# Patient Record
Sex: MALE | Race: WHITE | NOT HISPANIC OR LATINO | Employment: OTHER | URBAN - METROPOLITAN AREA
[De-identification: names, ages, dates, MRNs, and addresses within clinical notes are randomized per-mention and may not be internally consistent; named-entity substitution may affect disease eponyms.]

---

## 2020-08-15 ENCOUNTER — HOSPITAL ENCOUNTER (INPATIENT)
Facility: MEDICAL CENTER | Age: 80
LOS: 1 days | DRG: 310 | End: 2020-08-16
Attending: EMERGENCY MEDICINE | Admitting: INTERNAL MEDICINE
Payer: COMMERCIAL

## 2020-08-15 ENCOUNTER — APPOINTMENT (OUTPATIENT)
Dept: RADIOLOGY | Facility: MEDICAL CENTER | Age: 80
DRG: 310 | End: 2020-08-15
Attending: EMERGENCY MEDICINE
Payer: COMMERCIAL

## 2020-08-15 DIAGNOSIS — I47.10 PAROXYSMAL SVT (SUPRAVENTRICULAR TACHYCARDIA) (HCC): ICD-10-CM

## 2020-08-15 LAB
ALBUMIN SERPL BCP-MCNC: 4.2 G/DL (ref 3.2–4.9)
ALBUMIN/GLOB SERPL: 1.7 G/DL
ALP SERPL-CCNC: 59 U/L (ref 30–99)
ALT SERPL-CCNC: 39 U/L (ref 2–50)
ANION GAP SERPL CALC-SCNC: 16 MMOL/L (ref 7–16)
AST SERPL-CCNC: 29 U/L (ref 12–45)
BASOPHILS # BLD AUTO: 0.6 % (ref 0–1.8)
BASOPHILS # BLD: 0.04 K/UL (ref 0–0.12)
BILIRUB SERPL-MCNC: 0.4 MG/DL (ref 0.1–1.5)
BUN SERPL-MCNC: 22 MG/DL (ref 8–22)
CALCIUM SERPL-MCNC: 9.5 MG/DL (ref 8.5–10.5)
CHLORIDE SERPL-SCNC: 104 MMOL/L (ref 96–112)
CO2 SERPL-SCNC: 21 MMOL/L (ref 20–33)
CREAT SERPL-MCNC: 1.17 MG/DL (ref 0.5–1.4)
EKG IMPRESSION: NORMAL
EOSINOPHIL # BLD AUTO: 0.11 K/UL (ref 0–0.51)
EOSINOPHIL NFR BLD: 1.5 % (ref 0–6.9)
ERYTHROCYTE [DISTWIDTH] IN BLOOD BY AUTOMATED COUNT: 43.1 FL (ref 35.9–50)
GLOBULIN SER CALC-MCNC: 2.5 G/DL (ref 1.9–3.5)
GLUCOSE SERPL-MCNC: 101 MG/DL (ref 65–99)
HCT VFR BLD AUTO: 48.5 % (ref 42–52)
HGB BLD-MCNC: 16.5 G/DL (ref 14–18)
IMM GRANULOCYTES # BLD AUTO: 0.01 K/UL (ref 0–0.11)
IMM GRANULOCYTES NFR BLD AUTO: 0.1 % (ref 0–0.9)
LYMPHOCYTES # BLD AUTO: 2.94 K/UL (ref 1–4.8)
LYMPHOCYTES NFR BLD: 41.1 % (ref 22–41)
MAGNESIUM SERPL-MCNC: 2.1 MG/DL (ref 1.5–2.5)
MCH RBC QN AUTO: 31.3 PG (ref 27–33)
MCHC RBC AUTO-ENTMCNC: 34 G/DL (ref 33.7–35.3)
MCV RBC AUTO: 92 FL (ref 81.4–97.8)
MONOCYTES # BLD AUTO: 0.6 K/UL (ref 0–0.85)
MONOCYTES NFR BLD AUTO: 8.4 % (ref 0–13.4)
NEUTROPHILS # BLD AUTO: 3.45 K/UL (ref 1.82–7.42)
NEUTROPHILS NFR BLD: 48.3 % (ref 44–72)
NRBC # BLD AUTO: 0 K/UL
NRBC BLD-RTO: 0 /100 WBC
PLATELET # BLD AUTO: 197 K/UL (ref 164–446)
PMV BLD AUTO: 9.8 FL (ref 9–12.9)
POTASSIUM SERPL-SCNC: 4 MMOL/L (ref 3.6–5.5)
PROT SERPL-MCNC: 6.7 G/DL (ref 6–8.2)
RBC # BLD AUTO: 5.27 M/UL (ref 4.7–6.1)
SODIUM SERPL-SCNC: 141 MMOL/L (ref 135–145)
TROPONIN T SERPL-MCNC: 24 NG/L (ref 6–19)
WBC # BLD AUTO: 7.2 K/UL (ref 4.8–10.8)

## 2020-08-15 PROCEDURE — 770020 HCHG ROOM/CARE - TELE (206)

## 2020-08-15 PROCEDURE — C9803 HOPD COVID-19 SPEC COLLECT: HCPCS | Performed by: INTERNAL MEDICINE

## 2020-08-15 PROCEDURE — 80053 COMPREHEN METABOLIC PANEL: CPT

## 2020-08-15 PROCEDURE — 93005 ELECTROCARDIOGRAM TRACING: CPT | Performed by: EMERGENCY MEDICINE

## 2020-08-15 PROCEDURE — 99221 1ST HOSP IP/OBS SF/LOW 40: CPT | Performed by: INTERNAL MEDICINE

## 2020-08-15 PROCEDURE — 83735 ASSAY OF MAGNESIUM: CPT

## 2020-08-15 PROCEDURE — 71045 X-RAY EXAM CHEST 1 VIEW: CPT

## 2020-08-15 PROCEDURE — 99285 EMERGENCY DEPT VISIT HI MDM: CPT

## 2020-08-15 PROCEDURE — 85025 COMPLETE CBC W/AUTO DIFF WBC: CPT

## 2020-08-15 PROCEDURE — 36415 COLL VENOUS BLD VENIPUNCTURE: CPT

## 2020-08-15 PROCEDURE — 700102 HCHG RX REV CODE 250 W/ 637 OVERRIDE(OP): Performed by: INTERNAL MEDICINE

## 2020-08-15 PROCEDURE — 84484 ASSAY OF TROPONIN QUANT: CPT

## 2020-08-15 PROCEDURE — A9270 NON-COVERED ITEM OR SERVICE: HCPCS | Performed by: INTERNAL MEDICINE

## 2020-08-15 PROCEDURE — 93005 ELECTROCARDIOGRAM TRACING: CPT

## 2020-08-15 RX ORDER — ASPIRIN 81 MG/1
81 TABLET, CHEWABLE ORAL DAILY
COMMUNITY
End: 2023-05-22

## 2020-08-15 RX ORDER — AMOXICILLIN 250 MG
2 CAPSULE ORAL 2 TIMES DAILY
Status: DISCONTINUED | OUTPATIENT
Start: 2020-08-15 | End: 2020-08-16 | Stop reason: HOSPADM

## 2020-08-15 RX ORDER — OLMESARTAN MEDOXOMIL 20 MG/1
20 TABLET ORAL DAILY
Status: ON HOLD | COMMUNITY
End: 2020-08-16

## 2020-08-15 RX ORDER — BISACODYL 10 MG
10 SUPPOSITORY, RECTAL RECTAL
Status: DISCONTINUED | OUTPATIENT
Start: 2020-08-15 | End: 2020-08-16 | Stop reason: HOSPADM

## 2020-08-15 RX ORDER — POLYETHYLENE GLYCOL 3350 17 G/17G
1 POWDER, FOR SOLUTION ORAL
Status: DISCONTINUED | OUTPATIENT
Start: 2020-08-15 | End: 2020-08-16 | Stop reason: HOSPADM

## 2020-08-15 RX ADMIN — METOPROLOL TARTRATE 12.5 MG: 25 TABLET, FILM COATED ORAL at 06:38

## 2020-08-15 RX ADMIN — METOPROLOL TARTRATE 12.5 MG: 25 TABLET, FILM COATED ORAL at 20:06

## 2020-08-15 RX ADMIN — METOPROLOL TARTRATE 12.5 MG: 25 TABLET, FILM COATED ORAL at 13:10

## 2020-08-15 SDOH — HEALTH STABILITY: MENTAL HEALTH: HOW OFTEN DO YOU HAVE 6 OR MORE DRINKS ON ONE OCCASION?: MONTHLY

## 2020-08-15 SDOH — HEALTH STABILITY: MENTAL HEALTH: HOW MANY STANDARD DRINKS CONTAINING ALCOHOL DO YOU HAVE ON A TYPICAL DAY?: 1 OR 2

## 2020-08-15 SDOH — HEALTH STABILITY: MENTAL HEALTH: HOW OFTEN DO YOU HAVE A DRINK CONTAINING ALCOHOL?: 2-3 TIMES A WEEK

## 2020-08-15 ASSESSMENT — LIFESTYLE VARIABLES
EVER FELT BAD OR GUILTY ABOUT YOUR DRINKING: NO
CONSUMPTION TOTAL: NEGATIVE
AVERAGE NUMBER OF DAYS PER WEEK YOU HAVE A DRINK CONTAINING ALCOHOL: 3
EVER_SMOKED: NEVER
HOW MANY TIMES IN THE PAST YEAR HAVE YOU HAD 5 OR MORE DRINKS IN A DAY: 0
TOTAL SCORE: 0
EVER HAD A DRINK FIRST THING IN THE MORNING TO STEADY YOUR NERVES TO GET RID OF A HANGOVER: NO
ALCOHOL_USE: YES
TOTAL SCORE: 0
ON A TYPICAL DAY WHEN YOU DRINK ALCOHOL HOW MANY DRINKS DO YOU HAVE: 1
HAVE PEOPLE ANNOYED YOU BY CRITICIZING YOUR DRINKING: NO
HAVE YOU EVER FELT YOU SHOULD CUT DOWN ON YOUR DRINKING: NO
TOTAL SCORE: 0

## 2020-08-15 ASSESSMENT — COGNITIVE AND FUNCTIONAL STATUS - GENERAL
SUGGESTED CMS G CODE MODIFIER DAILY ACTIVITY: CH
SUGGESTED CMS G CODE MODIFIER MOBILITY: CH
MOBILITY SCORE: 24
DAILY ACTIVITIY SCORE: 24

## 2020-08-15 ASSESSMENT — ENCOUNTER SYMPTOMS
RESPIRATORY NEGATIVE: 1
CONSTITUTIONAL NEGATIVE: 1
GASTROINTESTINAL NEGATIVE: 1
MUSCULOSKELETAL NEGATIVE: 1
BRUISES/BLEEDS EASILY: 0
PSYCHIATRIC NEGATIVE: 1
CARDIOVASCULAR NEGATIVE: 1
DIZZINESS: 1
SHORTNESS OF BREATH: 0
BLOOD IN STOOL: 0

## 2020-08-15 ASSESSMENT — PATIENT HEALTH QUESTIONNAIRE - PHQ9
1. LITTLE INTEREST OR PLEASURE IN DOING THINGS: NOT AT ALL
SUM OF ALL RESPONSES TO PHQ9 QUESTIONS 1 AND 2: 0
2. FEELING DOWN, DEPRESSED, IRRITABLE, OR HOPELESS: NOT AT ALL

## 2020-08-15 ASSESSMENT — FIBROSIS 4 INDEX: FIB4 SCORE: 1.84

## 2020-08-15 NOTE — ED TRIAGE NOTES
Armond Marroquin  79 y.o.  male  Chief Complaint   Patient presents with   • Irregular Heart Beat     c/o irregular heart beat since monday morning. seen at [facility] wednesday night for the same was admitted and Dc 'd yesterday afternoon. patient states feeling he is going to pass out/ lightheaded.

## 2020-08-15 NOTE — H&P
Physician H&P    Patient ID:  Armond Marroquin  [id number]  79 y.o. male  1940    History:  Primary Diagnosis: SVT    HPI:  The patient is a very pleasant 79-year-old gentleman who was driven to the ER by his wife for follow-up of SVT.  He was hospitalized at [facility] recently where he was diagnosed with SVT.  Echo report shows normal LV systolic function and no pulmonary hypertension.  He was started on metoprolol twice daily, but noted that towards the end of the dosing period, he did have breakthrough SVT.  He became frustrated in the left the hospital AGAINST MEDICAL ADVICE 2 days ago.  According to the discharge summary from [facility], it was noted that on 25 mg of metoprolol his SVT was totally controlled but he had resting bradycardia into the low 50s.  His dose was reduced to 12.5 mg.  But this did not apparently control his SVT    The patient states that he has noted this for only the past few days.  He noticed on his blood pressure cuff that he was having rapid heart beating but did not feel particularly lightheaded or have a sense of palpitations.    He is active for his age at work he does not exercise regularly.  There is no history of known coronary disease, exercise intolerance, chest pain or exertional dyspnea.  He thinks he may have passed out very briefly while riding in the car on the way to the hospital last night.    Past Medical History:  has a past medical history of Hypertension.  Past Surgical History:  has no past surgical history on file.  Past Social History:  reports that he has never smoked. He has never used smokeless tobacco. He reports current alcohol use of about 2.4 oz of alcohol per week. He reports that he does not use drugs.  The patient is .  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    Past Family history: Mother  of complications of therapy of for hydrocephalus at age 78.  Father  age 89 from complications of a bladder  "infection.  No family history of premature coronary disease.  Allergies: Amoxicillin    Current Medications:  Prior to Admission medications    Medication Sig Start Date End Date Taking? Authorizing Provider   olmesartan (BENICAR) 20 MG Tab Take 20 mg by mouth every day.   Yes Nn Emergency Md Per Protocol, M.D.       Review of Systems:  Review of Systems   Constitutional: Negative.    HENT: Negative.    Respiratory: Negative.  Negative for shortness of breath.         Suspected sleep apnea per wife's report.  Patient snores   Cardiovascular: Negative.    Gastrointestinal: Negative.  Negative for blood in stool.   Musculoskeletal: Negative.    Skin: Negative.    Neurological: Positive for dizziness.        Patient may have had brief syncope in route to the hospital while sitting in the passenger seat of the car.   Endo/Heme/Allergies: Negative.  Does not bruise/bleed easily.   Psychiatric/Behavioral: Negative.      /90   Pulse (!) 109   Temp 36 °C (96.8 °F) (Temporal)   Resp 17   Ht 1.753 m (5' 9\")   Wt 96.6 kg (213 lb)   SpO2 99%     Physical Examination:  Physical Exam  Constitutional:       General: He is not in acute distress.     Appearance: He is well-developed. He is not diaphoretic.   HENT:      Head: Atraumatic.   Eyes:      Conjunctiva/sclera: Conjunctivae normal.      Pupils: Pupils are equal, round, and reactive to light.   Neck:      Musculoskeletal: Neck supple.      Vascular: No JVD.   Cardiovascular:      Rate and Rhythm: Normal rate and regular rhythm.      Heart sounds: No murmur.      Comments: Intermittent bursts of rapid heartbeat  Pulmonary:      Effort: Pulmonary effort is normal. No respiratory distress.      Breath sounds: Normal breath sounds. No wheezing or rales.   Abdominal:      Palpations: Abdomen is soft.      Tenderness: There is no abdominal tenderness.   Skin:     General: Skin is warm and dry.   Neurological:      Mental Status: He is alert and oriented to person, " place, and time.       EKG: My interpretation is demonstrates sinus tachycardia with right bundle branch block.  There is a sudden decrease in the sinus rate.    Monitoring strips in the ER demonstrated frequent runs of SVT up to 187 bpm.  The patient is asymptomatic for this.    Impression:    Supraventricular tachycardia: The patient has frequent brief runs of SVT for which he is asymptomatic.  Watching his rhythm in the ER, he has reached heart rates into the 180 bpm range with no symptoms.  He will be started on low-dose metoprolol at 12.5 mg thrice daily.  EP consultation will be obtained today for consideration of an ablation which would be a good strategy for him.    Possible sleep apnea: Wife notes that he snores significantly.  The patient does minimize this.    History of hypertension: Patient was treated with olmesartan and minoxidil at home.  Consider adjusting his hypertensive regimen.    Cough with ACE inhibitor: Patient documents this by history    Plan:    Start low-dose metoprolol 12.5 mg 3 times daily.  EP consultation for consideration of ablation.  Monitor blood pressure consider changing his regimen and discontinuing his minoxidil.      Dennis Byrd M.D.  8/15/2020

## 2020-08-15 NOTE — PROGRESS NOTES
· 2 RN skin check complete with ROSENDO Gregory.  · Devices in place PIV, tele box  · Skin assessed under devices yes  · Confirmed pressure ulcers found on n/a  · New potential pressure ulcers noted on n/a. Wound consult placed and wound reported n/a.   · The following interventions in place: patient turns self, pillows in use for support in positioning, moisturizer    Assessment:  · All skin intact, no breakdown noted

## 2020-08-15 NOTE — PROGRESS NOTES
Report received. Patient A&O x 4. Reports no pain. Monitor on, monitor room notified. Patient refusing bed alarm. Patient SVT in 180s. POC discussed, patient verbalized understanding. Belongings and bedside table within reach. Bed in low and locked positions. Fall precautions in place. Patient educated to call for assistance. Hourly rounding in place. No other needs at this time.

## 2020-08-15 NOTE — PROGRESS NOTES
Assumed care at 0700. Bedside report received from Bibiana. Patient's chart and MAR reviewed. Pt denies pain at this time. Pt is A & O 4. Patient was updated on plan of care for the day. Questions answered and concerns addressed.  Pt denies any additional needs at this time. White board updated. Call light, phone and personal belongings within reach.     Patient refusing bed alarm at this time. Pt educated about risk of falls causing serious injury or even death. Pt aware of risk. Charge RN, Alycia, notified.

## 2020-08-15 NOTE — ED PROVIDER NOTES
"ED Provider Note    Scribed for Lesli Baker M.D. by Lucas Sanchez. 8/15/2020  3:27 AM    Means of arrival: Walk-in  History obtained from: Patient  History limited by: None      CHIEF COMPLAINT  Chief Complaint   Patient presents with   • Irregular Heart Beat     c/o irregular heart beat since monday morning. seen at [facility] wednesday night for the same was admitted and Dc 'd yesterday afternoon. patient states feeling he is going to pass out/ lightheaded.       HPI  Armond Marroquin is a 79 y.o. male who presents to the Emergency Department for acute, intermittent lightheadedness with concerns of irregular heart rhythm onset 5 days ago. Patient reports that he had been feeling well until last Monday afternoon he noticed that HR was irregular on his BP cuff. He was seen at [facility] 2 days ago and admitted and treated with metoprolol for SVT however left AMA yesterday and told to follow up at [facility] for further evaluation. Patient notes that he initially planned to come in tomorrow, but felt acutely lightheaded and near syncopal earlier tonight. He reports that he has episodes where his heart \"literally stops, with no pulse\", and momentarily loses consciousness. Patient denies any associated vomiting, fever, or cough.   No chest pain or shortness of breath.    REVIEW OF SYSTEMS  Pertinent positive include lightheadedness, palpitations and near syncope. Pertinent negative include vomiting, fever, or cough, chest pain, shortness of breath. All other systems reviewed and are negative.      PAST MEDICAL HISTORY   has a past medical history of Hypertension.    SOCIAL HISTORY  Social History     Tobacco Use   • Smoking status: Never Smoker   • Smokeless tobacco: Never Used   Substance and Sexual Activity   • Alcohol use: Yes     Alcohol/week: 2.4 oz     Types: 4 Glasses of wine per week     Frequency: 2-3 times a week     Drinks per session: 1 or 2     Binge frequency: Monthly   • Drug use: Never       SURGICAL " "HISTORY  patient denies any surgical history    CURRENT MEDICATIONS  Current Outpatient Medications   Medication Instructions   • olmesartan (BENICAR) 20 mg, Oral, DAILY       ALLERGIES  Allergies   Allergen Reactions   • Amoxicillin        PHYSICAL EXAM   VITAL SIGNS: /70   Pulse 72   Temp 36 °C (96.8 °F) (Temporal)   Resp 13   Ht 1.753 m (5' 9\")   Wt 96.6 kg (213 lb)   SpO2 95%   BMI 31.45 kg/m²    Constitutional: Nontoxic appearing elderly male.  Alert in no apparent distress.  HENT: Normocephalic, Atraumatic. Bilateral external ears normal. Nose normal.  Moist mucous membranes.  Oropharynx clear.  Eyes: Pupils are equal and reactive. Conjunctiva normal.   Neck: Supple, full range of motion  Heart: Regular rate, irregularly irregular rhythm.  No murmurs.    Lungs: No respiratory distress, normal work of breathing. Lungs clear to auscultation bilaterally.  Abdomen Soft, no distention.  No tenderness to palpation.  Musculoskeletal: Atraumatic. No obvious deformities noted.  No lower extremity edema.  Skin: Warm, Dry.  No erythema, No rash.   Neurologic: Alert and oriented x3. Moving all extremities spontaneously without focal deficits.  Psychiatric: Affect normal, Mood normal, Appears appropriate and not intoxicated.      DIAGNOSTIC STUDIES    EKG  Results for orders placed or performed during the hospital encounter of 08/15/20   EKG   Result Value Ref Range    Report       [facility] Emergency Dept.    Test Date:  2020-08-15  Pt Name:    CHANTE PRECIADO                 Department: ER  MRN:        [id number]                      [place]:       [facility]  Gender:     Male                         Technician: [id number]  :        1940                   Requested By:ER TRIAGE PROTOCOL  Order #:    [id number]                    Reading MD: Lesli Baker MD    Measurements  Intervals                                Axis  Rate:       100                          P:          78  NM:         160     "                      QRS:        14  QRSD:       138                          T:          7  QT:         364  QTc:        470    Interpretive Statements  SINUS TACHYCARDIA WITH IRREGULAR RATE    RIGHT BUNDLE BRANCH BLOCK  No ST or T wave change  No previous ECG available for comparison  Electronically Signed On 8- 6:34:34 PDT by Lesli Baker MD           LABS  Personally reviewed by me  Labs Reviewed   CBC WITH DIFFERENTIAL - Abnormal; Notable for the following components:       Result Value    Lymphocytes 41.10 (*)     All other components within normal limits   COMP METABOLIC PANEL - Abnormal; Notable for the following components:    Glucose 101 (*)     All other components within normal limits   TROPONIN - Abnormal; Notable for the following components:    Troponin T 24 (*)     All other components within normal limits   MAGNESIUM   ESTIMATED GFR   COVID/SARS COV-2         RADIOLOGY  Personally reviewed by me  DX-CHEST-PORTABLE (1 VIEW)   Final Result      No acute cardiopulmonary abnormality.            ED COURSE  Vitals:    08/15/20 0530 08/15/20 0532 08/15/20 0534 08/15/20 0601   BP:  155/90  124/93   Pulse: 76 (!) 171 (!) 109 75   Resp:       Temp:       TempSrc:       SpO2: 96% 99% 99% 94%   Weight:       Height:           Old records personally reviewed:  Records from [place] show the patient was being seen for paroxysmal SVT episodes, he was treated with 25 metoprolol, but his heart rate when down to the 40s and they were discussed decreasing his dosage to 12.5 but the patient decided to leave AMA. He had an otherwise unremarkable workup including neg d-dimer and echo which showed normal EF.    3:27 AM Patient seen and examined at bedside. The patient presents with lightheadedness and concern for irregular heart rhythm. Ordered for DX-chest, CBC w/ diff, CMP, troponin, Magnesium, and EKG to evaluate.     MEDICAL DECISION MAKING  Patient with history of hypertension and recent diagnosis of  paroxysmal SVT who presents with persistent palpitations and lightheadedness.  The patient's vital signs are normal on arrival however his heart rate is very erratic from as low as 50 is high as 180 here in the department.  He does appear to be going in and out of intermittent SVT.  His EKG does not show signs of other arrhythmia or ischemia.  His labs are reassuring with normal electrolytes, no anemia.  His troponin is minimally elevated however he is not having any symptoms of ACS.  Patient did have a negative dimer at the outside hospital, clinically doubt pulmonary embolism.    4:47 AM Patient updated on results and plan to consult with Cardiology and likely need for admission for further monitoring.  Patient expresses understanding with plan of care    4:58 AM I discussed the patient's case and the above findings with Dr. Byrd (Cardiology) who plans to hospitalize the patient for monitoring and EP consultation for ablation likely on Monday.      DISPOSITION:  Patient will be hospitalized by Dr. Byrd in stable condition.    IMPRESSION  (I47.1) Paroxysmal SVT (supraventricular tachycardia) (HCC)    Results, diagnoses, and treatment options were discussed with the patient and/or family. Patient verbalized understanding of plan of care.    Current Discharge Medication List              Lucas LIRIANO (Scribe), am scribing for, and in the presence of, Lesli Baker M.D..    Electronically signed by: Lucas Sanchez (Yue), 8/15/2020    Lesli LIRIANO M.D. personally performed the services described in this documentation, as scribed by Lucas Sanchez in my presence, and it is both accurate and complete. C.    The note accurately reflects work and decisions made by me.  Lesli Baker M.D.  8/15/2020  6:39 AM

## 2020-08-16 VITALS
HEART RATE: 74 BPM | RESPIRATION RATE: 16 BRPM | WEIGHT: 211.42 LBS | DIASTOLIC BLOOD PRESSURE: 77 MMHG | BODY MASS INDEX: 31.31 KG/M2 | TEMPERATURE: 96.9 F | SYSTOLIC BLOOD PRESSURE: 120 MMHG | OXYGEN SATURATION: 97 % | HEIGHT: 69 IN

## 2020-08-16 LAB
CHOLEST SERPL-MCNC: 173 MG/DL (ref 100–199)
HDLC SERPL-MCNC: 38 MG/DL
LDLC SERPL CALC-MCNC: 115 MG/DL
TRIGL SERPL-MCNC: 102 MG/DL (ref 0–149)

## 2020-08-16 PROCEDURE — 700102 HCHG RX REV CODE 250 W/ 637 OVERRIDE(OP): Performed by: INTERNAL MEDICINE

## 2020-08-16 PROCEDURE — 36415 COLL VENOUS BLD VENIPUNCTURE: CPT

## 2020-08-16 PROCEDURE — A9270 NON-COVERED ITEM OR SERVICE: HCPCS | Performed by: INTERNAL MEDICINE

## 2020-08-16 PROCEDURE — 80061 LIPID PANEL: CPT

## 2020-08-16 PROCEDURE — 99223 1ST HOSP IP/OBS HIGH 75: CPT | Performed by: INTERNAL MEDICINE

## 2020-08-16 RX ORDER — METOPROLOL SUCCINATE 50 MG/1
50 TABLET, EXTENDED RELEASE ORAL
Status: DISCONTINUED | OUTPATIENT
Start: 2020-08-16 | End: 2020-08-16 | Stop reason: HOSPADM

## 2020-08-16 RX ORDER — METOPROLOL SUCCINATE 50 MG/1
50 TABLET, EXTENDED RELEASE ORAL DAILY
Qty: 30 TAB | Refills: 11 | Status: SHIPPED | OUTPATIENT
Start: 2020-08-17 | End: 2020-09-04 | Stop reason: SDUPTHER

## 2020-08-16 RX ADMIN — METOPROLOL SUCCINATE 50 MG: 50 TABLET, EXTENDED RELEASE ORAL at 09:38

## 2020-08-16 NOTE — PROGRESS NOTES
Pt HR sustaining in 40s, sinus bradycardia. HR dropping into 30s, unsustained. HR rising to 70-80s, unsustained. Order placed for 50mg oral metoprolol succinate. Contacted cardiology team to question order. Cardiology APRN states to wait on the medication for now and that she will reassess patient shortly. Holding medication at this time.

## 2020-08-16 NOTE — CONSULTS
Electrophysiology Initial Consult Note    DOS: 8/16/2020    Referring physician: Dr Byrd    Chief complaint/Reason for consult: SVT    HPI: 79-year-old man with history of hypertension, recently admitted to [facility], with complaints of palpitations.  These started last week.  They are associated with extreme dizziness, and he thinks he may have passed out at least once.  He was found to have SVT with heart rates going up to 190 bpm.  He was started on metoprolol, which he says worked really well.  They dose 25 mg twice daily.  He says that about 10 hours after each dose, he would note that he would start having palpitations again however.  While sleeping, his heart rate would drop into the 40s and 50s, so his dose was reduced to 12.5 mg twice daily, which is not very effective at managing his SVT.  He is frustrated, so he checked out and drove to the emergency room at [facility] to check him here.  Before a week ago, he denies prior history of palpitations.  No prior history of heart disease.  No chest pain or shortness of breath.  He takes Benicar for hypertension at home, no other meds.    ROS (+ highlighted in bold):  Constitutional: Fevers/chills/fatigue/weightloss  HEENT: Blurry vision/eye pain/sore throat/hearing loss  Respiratory: Shortness of breath/cough  Cardiovascular: Chest pain/palpitations/edema/orthopnea/syncope  GI: Nausea/vomitting/diarrhea  MSK: Arthralgias/myagias/muscle weakness  Skin: Rash/sores  Neurological: Numbness/tremors/vertigo  Endocrine: Excessive thirst/polyuria/cold intolerance/heat intolerance  Psych: Depression/anxiety    Past Medical History:   Diagnosis Date   • Hypertension        History reviewed. No pertinent surgical history.    Social History     Socioeconomic History   • Marital status: Single     Spouse name: Not on file   • Number of children: Not on file   • Years of education: Not on file   • Highest education level: Not on file   Occupational History   • Not  on file   Social Needs   • Financial resource strain: Not on file   • Food insecurity     Worry: Not on file     Inability: Not on file   • Transportation needs     Medical: Not on file     Non-medical: Not on file   Tobacco Use   • Smoking status: Never Smoker   • Smokeless tobacco: Never Used   Substance and Sexual Activity   • Alcohol use: Yes     Alcohol/week: 2.4 oz     Types: 4 Glasses of wine per week     Frequency: 2-3 times a week     Drinks per session: 1 or 2     Binge frequency: Monthly   • Drug use: Never   • Sexual activity: Not on file   Lifestyle   • Physical activity     Days per week: Not on file     Minutes per session: Not on file   • Stress: Not on file   Relationships   • Social connections     Talks on phone: Not on file     Gets together: Not on file     Attends Denominational service: Not on file     Active member of club or organization: Not on file     Attends meetings of clubs or organizations: Not on file     Relationship status: Not on file   • Intimate partner violence     Fear of current or ex partner: Not on file     Emotionally abused: Not on file     Physically abused: Not on file     Forced sexual activity: Not on file   Other Topics Concern   • Not on file   Social History Narrative   • Not on file       History reviewed. No pertinent family history.    Allergies   Allergen Reactions   • Amoxicillin        Current Facility-Administered Medications   Medication Dose Route Frequency Provider Last Rate Last Dose   • metoprolol (LOPRESSOR) tablet 12.5 mg  12.5 mg Oral Q8HRS Dennis Byrd M.D.   12.5 mg at 08/15/20 2006   • senna-docusate (PERICOLACE or SENOKOT S) 8.6-50 MG per tablet 2 Tab  2 Tab Oral BID Dennis Byrd M.D.        And   • polyethylene glycol/lytes (MIRALAX) PACKET 1 Packet  1 Packet Oral QDAY PRN Dennis Byrd M.D.        And   • magnesium hydroxide (MILK OF MAGNESIA) suspension 30 mL  30 mL Oral QDAY PRN Dennis Byrd M.D.        And   • bisacodyl (DULCOLAX)  suppository 10 mg  10 mg Rectal QDAY PRN Dennis Byrd M.D.       • enoxaparin (LOVENOX) inj 40 mg  40 mg Subcutaneous DAILY Dennis Byrd M.D.           Physical Exam:  Vitals:    08/15/20 1956 08/15/20 2116 08/15/20 2343 08/16/20 0409   BP: 156/104 109/61 114/67 117/84   Pulse: 70 72 (!) 50 100   Resp: 17 18 17 17   Temp: 36.4 °C (97.6 °F)  36.3 °C (97.4 °F) 36.7 °C (98.1 °F)   TempSrc: Temporal  Temporal Temporal   SpO2: 96%  95% 98%   Weight:       Height:         General appearance: NAD, conversant   Eyes: anicteric sclerae, moist conjunctivae; no lid-lag; PERRLA  HENT: Atraumatic; oropharynx clear with moist mucous membranes and no mucosal ulcerations; normal hard and soft palate  Neck: Trachea midline; FROM, supple, no thyromegaly or lymphadenopathy  Lungs: CTA, with normal respiratory effort and no intercostal retractions  CV: RRR, no MRGs, no JVD   Abdomen: Soft, non-tender; no masses or HSM  Extremities: No peripheral edema or extremity lymphadenopathy  Skin: Normal temperature, turgor and texture; no rash, ulcers or subcutaneous nodules  Psych: Appropriate affect, alert and oriented to person, place and time    Data:  Lipids:   Lab Results   Component Value Date/Time    CHOLSTRLTOT 173 08/16/2020 04:13 AM    TRIGLYCERIDE 102 08/16/2020 04:13 AM    HDL 38 (A) 08/16/2020 04:13 AM     (H) 08/16/2020 04:13 AM        BMP:  Lab Results   Component Value Date/Time    SODIUM 141 08/15/2020 0316    POTASSIUM 4.0 08/15/2020 0316    CHLORIDE 104 08/15/2020 0316    CO2 21 08/15/2020 0316    GLUCOSE 101 (H) 08/15/2020 0316    BUN 22 08/15/2020 0316    CREATININE 1.17 08/15/2020 0316    CALCIUM 9.5 08/15/2020 0316    ANION 16.0 08/15/2020 0316        TSH:   No results found for: TSHULTRASEN     THYROXINE (T4):   No results found for: FREEDIR     CBC:   Lab Results   Component Value Date/Time    WBC 7.2 08/15/2020 03:16 AM    RBC 5.27 08/15/2020 03:16 AM    HEMOGLOBIN 16.5 08/15/2020 03:16 AM    HEMATOCRIT  48.5 08/15/2020 03:16 AM    MCV 92.0 08/15/2020 03:16 AM    MCH 31.3 08/15/2020 03:16 AM    MCHC 34.0 08/15/2020 03:16 AM    RDW 43.1 08/15/2020 03:16 AM    PLATELETCT 197 08/15/2020 03:16 AM    MPV 9.8 08/15/2020 03:16 AM    NEUTSPOLYS 48.30 08/15/2020 03:16 AM    LYMPHOCYTES 41.10 (H) 08/15/2020 03:16 AM    MONOCYTES 8.40 08/15/2020 03:16 AM    EOSINOPHILS 1.50 08/15/2020 03:16 AM    BASOPHILS 0.60 08/15/2020 03:16 AM    IMMGRAN 0.10 08/15/2020 03:16 AM    NRBC 0.00 08/15/2020 03:16 AM    NEUTS 3.45 08/15/2020 03:16 AM    LYMPHS 2.94 08/15/2020 03:16 AM    MONOS 0.60 08/15/2020 03:16 AM    EOS 0.11 08/15/2020 03:16 AM    BASO 0.04 08/15/2020 03:16 AM    IMMGRANAB 0.01 08/15/2020 03:16 AM    NRBCAB 0.00 08/15/2020 03:16 AM        CBC w/o DIFF  Lab Results   Component Value Date/Time    WBC 7.2 08/15/2020 03:16 AM    RBC 5.27 08/15/2020 03:16 AM    HEMOGLOBIN 16.5 08/15/2020 03:16 AM    MCV 92.0 08/15/2020 03:16 AM    MCH 31.3 08/15/2020 03:16 AM    MCHC 34.0 08/15/2020 03:16 AM    RDW 43.1 08/15/2020 03:16 AM    MPV 9.8 08/15/2020 03:16 AM         EKG interpreted by me: Sinus with RBBB    Impression/Plan:  1. Paroxysmal SVT (supraventricular tachycardia) (HCC)       1.  Paroxysmal SVT  2.  Near syncope    -Paroxysmal SVT, most likely either atrial tachycardia or AVNRT.  Well controlled with metoprolol, but this may be limited by bradycardia.  -We discussed options, including pacemaker implantation and high-dose beta-blockers versus antiarrhythmic medications, alternatively EP study with ablation.  -Given that he felt really well with metoprolol, he wants to try this again.  He wants to try a long-acting formulation.  He says he has never been bothered by bradycardia, but he is pretty active.  -I recommend starting 50 mg XL today, and seeing how he does.  If he has breakthrough on this dose, we can consider increasing it, but if it is limited by bradycardia, he understands that we may need to consider pacemaker for  increased beta-blocker dose, versus EP study and ablation.  -Potentially could do a procedure tomorrow, however he feels well on long-acting metoprolol has good result, he can follow-up with me in clinic to discuss long-term management of this, especially if he does not like side effects of metoprolol and wishes to come off this in the future.    Thank you for this consult, EP can continue to follow, will be available to round tomorrow.    Luigi Velasquez MD  Cardiac Electrophysiology

## 2020-08-16 NOTE — PROGRESS NOTES
Pt HR is currently SR on monitor, sustaining 60-80s. Cardiology APRN states to given PO metoprolol at this time and to continue to monitor HR, especially over the next 2 hours. See MAR for administration.

## 2020-08-16 NOTE — PROGRESS NOTES
Monitor Summary:    S.B./S.T.  41/101 up to 190's and down to 32  (R)PVC's  (F)PAC  Big[place]  [birth year]

## 2020-08-16 NOTE — DISCHARGE SUMMARY
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    Near syncope secondary to above    Hypertension, well controlled    Likely sleep apnea, outpatient referral      Consults  EP consult  Procedures  None    HPI/hospital course  71-year-old man with past medical history significant for hypertension recently discharged from [facility] with complaints of palpitations.  Palpitations started a week ago and is associated with dizziness and near syncope.  He was found to have SVT with a heart rate up to 190.  He was started on low-dose metoprolol with good relief, but at [facility] due to dropping heart rate to 40s and 50s while patient asleep, the dose was reduced to 12.5 mg twice a day and it was not managing SVT.  Patient checked out of [facility] and drove to the emergency room at [facility].  Patient was evaluated by EP, Toprol-XL 50 mg started with good response, patient ambulated this afternoon, he still has frequent SVT with heart rate 120s noted, completely asymptomatic.  Will discharge him and he will follow-up closely with our cardiology office for further evaluation, will order ambulatory outpatient cardiac monitoring.        Labs reviewed    CBC and CMP unremarkable aside from glucose 101, , triglycerides 102, total cholesterol 173.    TSH per patient report was done at [facility],  was normal, will get records.    As well as echocardiogram was done at [facility],  Normal per patient report, will get records.      Discharge meds    Resume aspirin 81 mg p.o. daily  Stop Benicar  Prescribed Toprol-XL 50 q. evening      Discharge instructions    The patient is to follow-up with our cardiology office in 3 to 4 weeks.  We will arrange for outpatient cardiac monitoring.  We will get records from [facility] to be available for the next outpatient  visit.  Stable for discharge.

## 2020-08-16 NOTE — DISCHARGE PLANNING
Anticipated Discharge Disposition: Home    Action: Pt has discharge order. No discharge needs per chart review. This RN CM spoke to bedside nurse. No discharge needs from Case Management per ROSENDO Chan.    Barriers to Discharge: None    Plan: Pt to discharge home.

## 2020-08-16 NOTE — DISCHARGE INSTRUCTIONS
Discharge Instructions    Discharged to home by car with relative. Discharged via wheelchair, hospital escort: Yes.  Special equipment needed: Not Applicable    Be sure to schedule a follow-up appointment with your primary care doctor or any specialists as instructed.     Discharge Plan:   Diet Plan: Discussed  Activity Level: Discussed  Confirmed Follow up Appointment: Appointment Scheduled  Confirmed Symptoms Management: Discussed  Medication Reconciliation Updated: Yes    I understand that a diet low in cholesterol, fat, and sodium is recommended for good health. Unless I have been given specific instructions below for another diet, I accept this instruction as my diet prescription.   Other diet: Cardiac    Special Instructions: None    · Is patient discharged on Warfarin / Coumadin?   No     Depression / Suicide Risk    As you are discharged from this [facility] facility, it is important to learn how to keep safe from harming yourself.    Recognize the warning signs:  · Abrupt changes in personality, positive or negative- including increase in energy   · Giving away possessions  · Change in eating patterns- significant weight changes-  positive or negative  · Change in sleeping patterns- unable to sleep or sleeping all the time   · Unwillingness or inability to communicate  · Depression  · Unusual sadness, discouragement and loneliness  · Talk of wanting to die  · Neglect of personal appearance   · Rebelliousness- reckless behavior  · Withdrawal from people/activities they love  · Confusion- inability to concentrate     If you or a loved one observes any of these behaviors or has concerns about self-harm, here's what you can do:  · Talk about it- your feelings and reasons for harming yourself  · Remove any means that you might use to hurt yourself (examples: pills, rope, extension cords, firearm)  · Get professional help from the community (Mental Health, Substance Abuse, psychological counseling)  · Do not  be alone:Call your Safe Contact- someone whom you trust who will be there for you.  · Call your local CRISIS HOTLINE 804-1975 or 970-573-3202  · Call your local Children's Mobile Crisis Response Team [place] (172) 829-0278 or www.[employer] Telegram  · Call the toll free National Suicide Prevention Hotlines   · National Suicide Prevention Lifeline 303-635-QBWW (3269)  · National Repair Report Line Network 800-SUICIDE (828-2637)

## 2020-08-16 NOTE — PROGRESS NOTES
Bedside report received from previous nurse regarding prior 12 hours.  POC reviewed with pt.  White board updated.  Pt verbalizes understanding.  Call light within reach.  Pt tolerated morning meds. Cardiac monitor connected, all needs currently within reach. Bed locked and lowered. No additional requests at this time.

## 2020-08-17 ENCOUNTER — TELEPHONE (OUTPATIENT)
Dept: CARDIOLOGY | Facility: MEDICAL CENTER | Age: 80
End: 2020-08-17

## 2020-08-17 DIAGNOSIS — I48.0 PAROXYSMAL ATRIAL FIBRILLATION (HCC): ICD-10-CM

## 2020-08-17 NOTE — TELEPHONE ENCOUNTER
----- Message from Emiliana Peterson R.N. sent at 8/17/2020  9:49 AM PDT -----    ----- Message -----  From: WAQAS Kerr  Sent: 8/16/2020   2:54 PM PDT  To: Emiliana Peterson R.N.    HI patient of Dr Velasquez, needs referral to [facility] for sleep apnea please

## 2020-08-17 NOTE — PROGRESS NOTES
Pt dc'd to home. IV and monitor removed; monitor room notified. Pt left unit via ambulation with RN. Personal belongings with pt when leaving unit. Pt given discharge instructions prior to leaving unit including prescription and when to visit with physician; verbalizes understanding. Copy of discharge instructions with pt and in the chart.

## 2020-08-18 ENCOUNTER — TELEPHONE (OUTPATIENT)
Dept: CARDIOLOGY | Facility: MEDICAL CENTER | Age: 80
End: 2020-08-18

## 2020-08-18 DIAGNOSIS — I48.0 PAROXYSMAL ATRIAL FIBRILLATION (HCC): ICD-10-CM

## 2020-08-18 NOTE — TELEPHONE ENCOUNTER
Patient scheduled for both monitor and Dr. Velasquez. Patient does have an EKG machine at home that he can use to send tracings. Per Dr. Velasquez, I will leave the monitor as scheduled. He will send in his tracings and Dr. Velasquez will review to see if he still needs the monitor.

## 2020-08-18 NOTE — TELEPHONE ENCOUNTER
"Dr. Velasquez,    Per this patient - \"that medication\" is helping his episodes. There is a couple of hours that the medication runs out. Do you have any recommendations on help him get that additional coverage?    Please advise.    Thank You,  Chelsi"

## 2020-08-18 NOTE — TELEPHONE ENCOUNTER
----- Message from WAQAS Kerr sent at 8/16/2020  2:22 PM PDT -----   DR Velasquez consulted on the patient at the [facility] for SVT/AVNRT.    Needs follow up with Dr Velasquez after either ZIo patch , or if faster 48 hours holter monitor is acceptable too please to rule out recurrent SVT and bradycardia     Thank you

## 2020-08-19 NOTE — TELEPHONE ENCOUNTER
Pt updated, will call back for new RX if BID dose works for him    Luigi Velasquez M.D.  You; Chelsi Cool, Med Ass't 19 hours ago (1:56 PM)     Let's have him try taking MTP XL 50mg twice a day instead of once a day and see if that helps. If it does, we can adjust his Rx for BID dosing.     GABI

## 2020-09-04 DIAGNOSIS — I47.10 SVT (SUPRAVENTRICULAR TACHYCARDIA) (HCC): ICD-10-CM

## 2020-09-04 RX ORDER — METOPROLOL SUCCINATE 50 MG/1
50 TABLET, EXTENDED RELEASE ORAL 2 TIMES DAILY
Qty: 60 TAB | Refills: 1 | Status: SHIPPED | OUTPATIENT
Start: 2020-09-04 | End: 2020-09-29

## 2020-09-17 ENCOUNTER — NON-PROVIDER VISIT (OUTPATIENT)
Dept: CARDIOLOGY | Facility: MEDICAL CENTER | Age: 80
End: 2020-09-17
Payer: COMMERCIAL

## 2020-09-17 DIAGNOSIS — R00.2 PALPITATIONS: ICD-10-CM

## 2020-09-17 DIAGNOSIS — I49.1 PAC (PREMATURE ATRIAL CONTRACTION): ICD-10-CM

## 2020-09-17 DIAGNOSIS — I48.0 PAF (PAROXYSMAL ATRIAL FIBRILLATION) (HCC): ICD-10-CM

## 2020-09-17 LAB — EKG IMPRESSION: NORMAL

## 2020-09-17 PROCEDURE — 93224 XTRNL ECG REC UP TO 48 HRS: CPT | Performed by: INTERNAL MEDICINE

## 2020-09-25 DIAGNOSIS — I48.0 PAROXYSMAL ATRIAL FIBRILLATION (HCC): ICD-10-CM

## 2020-09-29 ENCOUNTER — OFFICE VISIT (OUTPATIENT)
Dept: CARDIOLOGY | Facility: MEDICAL CENTER | Age: 80
End: 2020-09-29
Payer: COMMERCIAL

## 2020-09-29 VITALS
DIASTOLIC BLOOD PRESSURE: 70 MMHG | OXYGEN SATURATION: 96 % | BODY MASS INDEX: 33.08 KG/M2 | WEIGHT: 224 LBS | HEART RATE: 51 BPM | SYSTOLIC BLOOD PRESSURE: 134 MMHG

## 2020-09-29 DIAGNOSIS — I48.0 PAROXYSMAL ATRIAL FIBRILLATION (HCC): ICD-10-CM

## 2020-09-29 DIAGNOSIS — I47.10 SVT (SUPRAVENTRICULAR TACHYCARDIA) (HCC): ICD-10-CM

## 2020-09-29 LAB — EKG IMPRESSION: NORMAL

## 2020-09-29 PROCEDURE — 99214 OFFICE O/P EST MOD 30 MIN: CPT | Performed by: INTERNAL MEDICINE

## 2020-09-29 PROCEDURE — 93000 ELECTROCARDIOGRAM COMPLETE: CPT | Performed by: INTERNAL MEDICINE

## 2020-09-29 RX ORDER — OLMESARTAN MEDOXOMIL 20 MG/1
20 TABLET ORAL DAILY
COMMUNITY

## 2020-09-29 RX ORDER — METOPROLOL SUCCINATE 50 MG/1
50 TABLET, EXTENDED RELEASE ORAL 2 TIMES DAILY
Qty: 180 TAB | Refills: 3 | Status: SHIPPED | OUTPATIENT
Start: 2020-09-29

## 2020-09-29 RX ORDER — A/SINGAPORE/GP1908/2015 IVR-180 (AN A/MICHIGAN/45/2015 (H1N1)PDM09-LIKE VIRUS, A/HONG KONG/4801/2014, NYMC X-263B (H3N2) (AN A/HONG KONG/4801/2014-LIKE VIRUS), AND B/BRISBANE/60/2008, WILD TYPE (A B/BRISBANE/60/2008-LIKE VIRUS) 15; 15; 15 UG/.5ML; UG/.5ML; UG/.5ML
INJECTION, SUSPENSION INTRAMUSCULAR
COMMUNITY
Start: 2020-09-10 | End: 2023-05-22

## 2020-09-29 ASSESSMENT — FIBROSIS 4 INDEX: FIB4 SCORE: 1.86

## 2020-09-30 NOTE — PROGRESS NOTES
Arrhythmia Clinic Note (Established patient)    DOS: 9/29/2020    Chief complaint/Reason for consult: SVT    Interval History: Doing well on metoprolol 50 mg XL twice daily.  No further episodes of SVT.  Holter monitor did not show any episodes of SVT.  He is little bit tired when working but this is not too problematic for him.  His Holter monitor did show that he has average resting bradycardia.  No syncope or presyncope.  No palpitations.  No chest pain or shortness of breath.    ROS (+ highlighted in bold):  Constitutional: Fevers/chills/fatigue/weightloss  HEENT: Blurry vision/eye pain/sore throat/hearing loss  Respiratory: Shortness of breath/cough  Cardiovascular: Chest pain/palpitations/edema/orthopnea/syncope  GI: Nausea/vomitting/diarrhea  MSK: Arthralgias/myagias/muscle weakness  Skin: Rash/sores  Neurological: Numbness/tremors/vertigo  Endocrine: Excessive thirst/polyuria/cold intolerance/heat intolerance  Psych: Depression/anxiety    Past Medical History:   Diagnosis Date   • Hypertension    SVT    No past surgical history on file.    Social History     Socioeconomic History   • Marital status: Single     Spouse name: Not on file   • Number of children: Not on file   • Years of education: Not on file   • Highest education level: Not on file   Occupational History   • Not on file   Social Needs   • Financial resource strain: Not on file   • Food insecurity     Worry: Not on file     Inability: Not on file   • Transportation needs     Medical: Not on file     Non-medical: Not on file   Tobacco Use   • Smoking status: Never Smoker   • Smokeless tobacco: Never Used   Substance and Sexual Activity   • Alcohol use: Yes     Alcohol/week: 2.4 oz     Types: 4 Glasses of wine per week     Frequency: 2-3 times a week     Drinks per session: 1 or 2     Binge frequency: Monthly   • Drug use: Never   • Sexual activity: Not on file   Lifestyle   • Physical activity     Days per week: Not on file     Minutes per  session: Not on file   • Stress: Not on file   Relationships   • Social connections     Talks on phone: Not on file     Gets together: Not on file     Attends Mandaen service: Not on file     Active member of club or organization: Not on file     Attends meetings of clubs or organizations: Not on file     Relationship status: Not on file   • Intimate partner violence     Fear of current or ex partner: Not on file     Emotionally abused: Not on file     Physically abused: Not on file     Forced sexual activity: Not on file   Other Topics Concern   • Not on file   Social History Narrative   • Not on file       No family history on file.   No family history of premature coronary disease    Allergies   Allergen Reactions   • Amoxicillin        Current Outpatient Medications   Medication Sig Dispense Refill   • olmesartan (BENICAR) 20 MG Tab Take 20 mg by mouth every day.     • metoprolol SR (TOPROL XL) 50 MG TABLET SR 24 HR Take 1 Tab by mouth 2 Times a Day. 180 Tab 3   • aspirin (ASA) 81 MG Chew Tab chewable tablet Take 81 mg by mouth every day.     • FLUAD QUADRIVALENT 0.5 ML Prefilled Syringe        No current facility-administered medications for this visit.        Physical Exam:  Vitals:    09/29/20 1622   BP: 134/70   BP Location: Left arm   Patient Position: Sitting   BP Cuff Size: Adult   Pulse: (!) 51   SpO2: 96%   Weight: 101.6 kg (224 lb)     General appearance: NAD, conversant   Eyes: anicteric sclerae, moist conjunctivae; no lid-lag; PERRLA  HENT: Atraumatic; oropharynx clear with moist mucous membranes and no mucosal ulcerations; normal hard and soft palate  Neck: Trachea midline; FROM, supple, no thyromegaly or lymphadenopathy  Lungs: CTA, with normal respiratory effort and no intercostal retractions  CV: bradycardia, no MRGs, no JVD  Abdomen: Soft, non-tender; no masses or HSM  Extremities: No peripheral edema or extremity lymphadenopathy  Skin: Normal temperature, turgor and texture; no rash, ulcers  or subcutaneous nodules  Psych: Appropriate affect, alert and oriented to person, place and time    Data:  Lipids:   Lab Results   Component Value Date/Time    CHOLSTRLTOT 173 08/16/2020 04:13 AM    TRIGLYCERIDE 102 08/16/2020 04:13 AM    HDL 38 (A) 08/16/2020 04:13 AM     (H) 08/16/2020 04:13 AM        BMP:  Lab Results   Component Value Date/Time    SODIUM 141 08/15/2020 0316    POTASSIUM 4.0 08/15/2020 0316    CHLORIDE 104 08/15/2020 0316    CO2 21 08/15/2020 0316    GLUCOSE 101 (H) 08/15/2020 0316    BUN 22 08/15/2020 0316    CREATININE 1.17 08/15/2020 0316    CALCIUM 9.5 08/15/2020 0316    ANION 16.0 08/15/2020 0316        TSH:   No results found for: TSHULTRASEN     THYROXINE (T4):   No results found for: FREEDIR     CBC:   Lab Results   Component Value Date/Time    WBC 7.2 08/15/2020 03:16 AM    RBC 5.27 08/15/2020 03:16 AM    HEMOGLOBIN 16.5 08/15/2020 03:16 AM    HEMATOCRIT 48.5 08/15/2020 03:16 AM    MCV 92.0 08/15/2020 03:16 AM    MCH 31.3 08/15/2020 03:16 AM    MCHC 34.0 08/15/2020 03:16 AM    RDW 43.1 08/15/2020 03:16 AM    PLATELETCT 197 08/15/2020 03:16 AM    MPV 9.8 08/15/2020 03:16 AM    NEUTSPOLYS 48.30 08/15/2020 03:16 AM    LYMPHOCYTES 41.10 (H) 08/15/2020 03:16 AM    MONOCYTES 8.40 08/15/2020 03:16 AM    EOSINOPHILS 1.50 08/15/2020 03:16 AM    BASOPHILS 0.60 08/15/2020 03:16 AM    IMMGRAN 0.10 08/15/2020 03:16 AM    NRBC 0.00 08/15/2020 03:16 AM    NEUTS 3.45 08/15/2020 03:16 AM    LYMPHS 2.94 08/15/2020 03:16 AM    MONOS 0.60 08/15/2020 03:16 AM    EOS 0.11 08/15/2020 03:16 AM    BASO 0.04 08/15/2020 03:16 AM    IMMGRANAB 0.01 08/15/2020 03:16 AM    NRBCAB 0.00 08/15/2020 03:16 AM        CBC w/o DIFF  Lab Results   Component Value Date/Time    WBC 7.2 08/15/2020 03:16 AM    RBC 5.27 08/15/2020 03:16 AM    HEMOGLOBIN 16.5 08/15/2020 03:16 AM    MCV 92.0 08/15/2020 03:16 AM    MCH 31.3 08/15/2020 03:16 AM    MCHC 34.0 08/15/2020 03:16 AM    RDW 43.1 08/15/2020 03:16 AM    MPV 9.8  08/15/2020 03:16 AM       Holter monitor reviewed: No arrhythmias, average heart rate 53 bpm    EKG interpreted by me: Sinus bradycardia    Impression/Plan:  1.  Paroxysmal SVT  2.  Sinus bradycardia  3.  Hypertension    -Continue metoprolol XL 50 mg twice daily for treatment of SVT  -If metoprolol XL fails to treat his SVT, we will reconsider EP study and ablation  -If he begins having more sinus node dysfunction in setting of metoprolol, we discussed that he would either need to stop metoprolol and pursue EP study and ablation, or potentially get a pacemaker.  He understands these options.    We will follow-up again in 6 months    Luigi Velasquez MD  Cardiac Electrophysiology